# Patient Record
Sex: MALE | Race: OTHER | Employment: FULL TIME | URBAN - METROPOLITAN AREA
[De-identification: names, ages, dates, MRNs, and addresses within clinical notes are randomized per-mention and may not be internally consistent; named-entity substitution may affect disease eponyms.]

---

## 2023-04-03 PROBLEM — Z76.89 ENCOUNTER TO ESTABLISH CARE WITH NEW DOCTOR: Status: ACTIVE | Noted: 2023-04-03

## 2023-06-15 PROBLEM — R09.81 NASAL CONGESTION: Status: ACTIVE | Noted: 2020-05-06

## 2023-06-15 PROBLEM — J30.9 ALLERGIC RHINITIS: Status: ACTIVE | Noted: 2020-05-06

## 2023-06-15 PROBLEM — M41.84 DEXTROSCOLIOSIS OF THORACIC SPINE: Status: ACTIVE | Noted: 2023-06-15

## 2023-06-15 PROBLEM — M54.6 MIDLINE THORACIC BACK PAIN: Status: ACTIVE | Noted: 2023-06-15

## 2023-06-20 ENCOUNTER — OFFICE VISIT (OUTPATIENT)
Dept: ORTHOPEDIC SURGERY | Age: 28
End: 2023-06-20
Payer: COMMERCIAL

## 2023-06-20 VITALS — BODY MASS INDEX: 19.11 KG/M2 | HEIGHT: 69 IN | WEIGHT: 129 LBS

## 2023-06-20 DIAGNOSIS — M41.124 ADOLESCENT IDIOPATHIC SCOLIOSIS OF THORACIC REGION: ICD-10-CM

## 2023-06-20 DIAGNOSIS — M54.50 LOW BACK PAIN, UNSPECIFIED BACK PAIN LATERALITY, UNSPECIFIED CHRONICITY, UNSPECIFIED WHETHER SCIATICA PRESENT: Primary | ICD-10-CM

## 2023-06-20 DIAGNOSIS — M40.204 KYPHOSIS OF THORACIC REGION, UNSPECIFIED KYPHOSIS TYPE: ICD-10-CM

## 2023-06-20 PROCEDURE — 99204 OFFICE O/P NEW MOD 45 MIN: CPT | Performed by: NURSE PRACTITIONER

## 2023-06-20 RX ORDER — METHYLPREDNISOLONE 4 MG/1
TABLET ORAL
Qty: 1 KIT | Refills: 0 | Status: SHIPPED | OUTPATIENT
Start: 2023-06-20

## 2023-06-20 RX ORDER — CYCLOBENZAPRINE HCL 5 MG
5 TABLET ORAL 3 TIMES DAILY PRN
Qty: 30 TABLET | Refills: 0 | Status: SHIPPED | OUTPATIENT
Start: 2023-06-20 | End: 2023-06-30

## 2023-06-20 NOTE — PROGRESS NOTES
Name: Ian Castillo  YOB: 1995  Gender: male  MRN: [id number]    CC: Thoracic back pain    HPI: This is a 29y.o. year old male who had acute on chronic complaints of thoracic back pain that is gotten worse through the years. Most recently the most acute episode has been over the past week. Pain occurs in the mid thoracic spine more towards the right scapula. He has known thoracic scoliosis. He has had no conservative treatment. He was referred to me by his primary care provider. He denies any lower extremity numbness or tingling. No tingling or numbness in the upper extremities. This patient  has not had lumbar surgery in the past.     Thus far, the patient has tried : Minimal conservative treatment  Current pain level: 5  Activities limited by pain: Sitting, bending, twisting       AMB PAIN ASSESSMENT 6/20/2023   Location of Pain Back   Location Modifiers Right   Frequency of Pain Constant   Limiting Behavior Yes   Result of Injury No   Work-Related Injury No   Are there other pain locations you wish to document? No            ROS/Meds/PSH/PMH/FH/SH: I personally reviewed the patient's collected intake data. Below are the pertinents:    No Known Allergies      Current Outpatient Medications:     methylPREDNISolone (MEDROL DOSEPACK) 4 MG tablet, Take by mouth as directed. Start in morning, Disp: 1 kit, Rfl: 0    cyclobenzaprine (FLEXERIL) 5 MG tablet, Take 1 tablet by mouth 3 times daily as needed for Muscle spasms, Disp: 30 tablet, Rfl: 0    diclofenac (VOLTAREN) 50 MG EC tablet, Take 1 tablet by mouth 2 times daily as needed for Pain, Disp: 60 tablet, Rfl: 3    No past surgical history on file. Patient Active Problem List   Diagnosis    Encounter to establish care with new doctor    Allergic rhinitis    Dextroscoliosis of thoracic spine    Midline thoracic back pain         Tobacco:  reports that he has never smoked.  He has never used smokeless tobacco.  Alcohol:   Social History

## 2024-08-13 ENCOUNTER — OFFICE VISIT (OUTPATIENT)
Dept: PRIMARY CARE CLINIC | Facility: CLINIC | Age: 29
End: 2024-08-13
Payer: COMMERCIAL

## 2024-08-13 VITALS
DIASTOLIC BLOOD PRESSURE: 64 MMHG | WEIGHT: 125 LBS | BODY MASS INDEX: 18.51 KG/M2 | TEMPERATURE: 98 F | HEIGHT: 69 IN | SYSTOLIC BLOOD PRESSURE: 114 MMHG | OXYGEN SATURATION: 99 % | HEART RATE: 69 BPM

## 2024-08-13 DIAGNOSIS — R45.86 MOOD SWINGS: ICD-10-CM

## 2024-08-13 DIAGNOSIS — Z76.89 ENCOUNTER TO ESTABLISH CARE WITH NEW DOCTOR: Primary | ICD-10-CM

## 2024-08-13 DIAGNOSIS — G47.00 INSOMNIA, UNSPECIFIED TYPE: ICD-10-CM

## 2024-08-13 PROCEDURE — 99214 OFFICE O/P EST MOD 30 MIN: CPT | Performed by: FAMILY MEDICINE

## 2024-08-13 RX ORDER — QUETIAPINE FUMARATE 25 MG/1
25 TABLET, FILM COATED ORAL NIGHTLY
Qty: 90 TABLET | Refills: 1 | Status: SHIPPED | OUTPATIENT
Start: 2024-08-13

## 2024-08-13 RX ORDER — FLUOXETINE 10 MG/1
10 CAPSULE ORAL DAILY
Qty: 90 CAPSULE | Refills: 1 | Status: SHIPPED | OUTPATIENT
Start: 2024-08-13

## 2024-08-13 SDOH — ECONOMIC STABILITY: INCOME INSECURITY: HOW HARD IS IT FOR YOU TO PAY FOR THE VERY BASICS LIKE FOOD, HOUSING, MEDICAL CARE, AND HEATING?: NOT HARD AT ALL

## 2024-08-13 SDOH — ECONOMIC STABILITY: FOOD INSECURITY: WITHIN THE PAST 12 MONTHS, YOU WORRIED THAT YOUR FOOD WOULD RUN OUT BEFORE YOU GOT MONEY TO BUY MORE.: NEVER TRUE

## 2024-08-13 SDOH — ECONOMIC STABILITY: FOOD INSECURITY: WITHIN THE PAST 12 MONTHS, THE FOOD YOU BOUGHT JUST DIDN'T LAST AND YOU DIDN'T HAVE MONEY TO GET MORE.: NEVER TRUE

## 2024-08-13 ASSESSMENT — PATIENT HEALTH QUESTIONNAIRE - PHQ9
9. THOUGHTS THAT YOU WOULD BE BETTER OFF DEAD, OR OF HURTING YOURSELF: NOT AT ALL
6. FEELING BAD ABOUT YOURSELF - OR THAT YOU ARE A FAILURE OR HAVE LET YOURSELF OR YOUR FAMILY DOWN: NOT AT ALL
SUM OF ALL RESPONSES TO PHQ QUESTIONS 1-9: 12
SUM OF ALL RESPONSES TO PHQ QUESTIONS 1-9: 12
7. TROUBLE CONCENTRATING ON THINGS, SUCH AS READING THE NEWSPAPER OR WATCHING TELEVISION: NOT AT ALL
5. POOR APPETITE OR OVEREATING: NOT AT ALL
3. TROUBLE FALLING OR STAYING ASLEEP: NEARLY EVERY DAY
4. FEELING TIRED OR HAVING LITTLE ENERGY: NEARLY EVERY DAY
8. MOVING OR SPEAKING SO SLOWLY THAT OTHER PEOPLE COULD HAVE NOTICED. OR THE OPPOSITE, BEING SO FIGETY OR RESTLESS THAT YOU HAVE BEEN MOVING AROUND A LOT MORE THAN USUAL: NOT AT ALL
SUM OF ALL RESPONSES TO PHQ QUESTIONS 1-9: 12
1. LITTLE INTEREST OR PLEASURE IN DOING THINGS: NEARLY EVERY DAY
10. IF YOU CHECKED OFF ANY PROBLEMS, HOW DIFFICULT HAVE THESE PROBLEMS MADE IT FOR YOU TO DO YOUR WORK, TAKE CARE OF THINGS AT HOME, OR GET ALONG WITH OTHER PEOPLE: SOMEWHAT DIFFICULT
SUM OF ALL RESPONSES TO PHQ9 QUESTIONS 1 & 2: 6
2. FEELING DOWN, DEPRESSED OR HOPELESS: NEARLY EVERY DAY
SUM OF ALL RESPONSES TO PHQ QUESTIONS 1-9: 12

## 2024-08-13 ASSESSMENT — ENCOUNTER SYMPTOMS
VOMITING: 0
NAUSEA: 0
ABDOMINAL PAIN: 0
COUGH: 0
DIARRHEA: 0
SHORTNESS OF BREATH: 0

## 2024-08-13 NOTE — PATIENT INSTRUCTIONS
IT WAS GREAT TO SEE YOU TODAY!    WE WILL CHECK LABS WHEN YOU RETURN SO PLEASE COME FASTING (NOTHING TO EAT OR DRINK AFTER MIDNIGHT THE NIGHT BEFORE EXCEPT PLAIN WATER AND YOUR MEDICINE).    PLEASE TAKE ALL MEDICATION AS DISCUSSED.    ~TAKE THE FLUOXETINE WHEN YOU WAKE UP TO HELP WITH MOOD AND IRRITABILITY.    ~TAKE THE QUETIAPINE AT BEDTIME TO HELP WITH SLEEP.    CALL ONE OF THE THERAPISTS/COUNSELORS ON THE LIST TO SEE IF THEY CAN PROVIDE SOME HELP WITH YOUR MOOD SWINGS.    I WILL SEE YOU AGAIN IN 3 MONTHS BUT PLEASE CALL WITH CONCERNS [phone]

## 2024-08-13 NOTE — ASSESSMENT & PLAN NOTE
Chronic, patient works third shift and has issues with sleeping during the day.  Also with mood swings, family with history of depression, anxiety and bipolar disorder.  Will prescribe 25 mg of quetiapine at bedtime when patient feels more to help with sleep.  Will recheck in 3 months.

## 2024-08-13 NOTE — ASSESSMENT & PLAN NOTE
Chronic but worsening.  Patient feels like he is irritable and wife notes up-and-down mood swings with seemingly small triggers.  Plan to try low-dose fluoxetine 10 mg when he gets up to help with this.  Given list of therapists for patient to find a counselor.  Will recheck in 3 months.

## 2024-08-13 NOTE — PROGRESS NOTES
[facility] - [facility]  Jessica Meier Maribel, DO  [address], [place] [address] [phone]        ASSESSMENT AND PLAN    Problem List Items Addressed This Visit          Other    Encounter to establish care with new doctor - Primary    Mood swings      Chronic but worsening.  Patient feels like he is irritable and wife notes up-and-down mood swings with seemingly small triggers.  Plan to try low-dose fluoxetine 10 mg when he gets up to help with this.  Given list of therapists for patient to find a counselor.  Will recheck in 3 months.         Insomnia      Chronic, patient works third shift and has issues with sleeping during the day.  Also with mood swings, family with history of depression, anxiety and bipolar disorder.  Will prescribe 25 mg of quetiapine at bedtime when patient feels more to help with sleep.  Will recheck in 3 months.             The diagnoses and plan were discussed with the patient, who verbalizes understanding and agrees with plan.  All questions answered.    Chief Complaint    Chief Complaint   Patient presents with    Insomnia    Mood Swings    Fatigue       HISTORY OF PRESENT ILLNESS    29 y.o. male presents today to establish care with a new primary care provider.  Started having issues with depression and anger about five years ago.  Has never taken medicine for this before.  Has also had issues with sleep, as well.  States that he tries to get some sleep, usually only sleeps for an hour or two at a time.  Wife notes that he snores loudly.  Step-dad has sleep apnea.  Notes a lot of fatigue.  States that he has had mood swings, notes that the smallest thing will set him off.  States that his mother and father have depression and anxiety.  Aunt has bipolar disorder.  Sister also has depression and anxiety.      PAST MEDICAL HISTORY    Past Medical History:   Diagnosis Date    Scoliosis        PAST SURGICAL HISTORY    No past surgical history on

## 2024-12-23 ENCOUNTER — LAB (OUTPATIENT)
Dept: OBGYN CLINIC | Age: 29
End: 2024-12-23

## 2024-12-23 DIAGNOSIS — Z13.71 SCREENING FOR GENETIC DISEASE CARRIER STATUS: Primary | ICD-10-CM

## 2024-12-27 LAB — HGB FRACT BLD ELPH-IMP: NORMAL

## 2025-04-21 ENCOUNTER — OFFICE VISIT (OUTPATIENT)
Dept: PRIMARY CARE CLINIC | Facility: CLINIC | Age: 30
End: 2025-04-21
Payer: COMMERCIAL

## 2025-04-21 VITALS
HEIGHT: 69 IN | OXYGEN SATURATION: 98 % | BODY MASS INDEX: 17.92 KG/M2 | HEART RATE: 114 BPM | WEIGHT: 121 LBS | DIASTOLIC BLOOD PRESSURE: 62 MMHG | SYSTOLIC BLOOD PRESSURE: 112 MMHG | TEMPERATURE: 98.4 F

## 2025-04-21 DIAGNOSIS — F51.01 PRIMARY INSOMNIA: ICD-10-CM

## 2025-04-21 DIAGNOSIS — R00.0 TACHYCARDIA: ICD-10-CM

## 2025-04-21 DIAGNOSIS — F33.1 MODERATE EPISODE OF RECURRENT MAJOR DEPRESSIVE DISORDER (HCC): Primary | ICD-10-CM

## 2025-04-21 PROCEDURE — 99214 OFFICE O/P EST MOD 30 MIN: CPT | Performed by: FAMILY MEDICINE

## 2025-04-21 RX ORDER — QUETIAPINE FUMARATE 25 MG/1
25 TABLET, FILM COATED ORAL NIGHTLY
Qty: 90 TABLET | Refills: 1 | Status: SHIPPED | OUTPATIENT
Start: 2025-04-21

## 2025-04-21 RX ORDER — SERTRALINE HYDROCHLORIDE 25 MG/1
25 TABLET, FILM COATED ORAL DAILY
Qty: 90 TABLET | Refills: 1 | Status: SHIPPED | OUTPATIENT
Start: 2025-04-21

## 2025-04-21 SDOH — ECONOMIC STABILITY: FOOD INSECURITY: WITHIN THE PAST 12 MONTHS, YOU WORRIED THAT YOUR FOOD WOULD RUN OUT BEFORE YOU GOT MONEY TO BUY MORE.: NEVER TRUE

## 2025-04-21 SDOH — ECONOMIC STABILITY: FOOD INSECURITY: WITHIN THE PAST 12 MONTHS, THE FOOD YOU BOUGHT JUST DIDN'T LAST AND YOU DIDN'T HAVE MONEY TO GET MORE.: NEVER TRUE

## 2025-04-21 ASSESSMENT — ENCOUNTER SYMPTOMS
SHORTNESS OF BREATH: 0
ABDOMINAL PAIN: 0
NAUSEA: 0
COUGH: 0
DIARRHEA: 0
VOMITING: 0

## 2025-04-21 ASSESSMENT — PATIENT HEALTH QUESTIONNAIRE - PHQ9
4. FEELING TIRED OR HAVING LITTLE ENERGY: NEARLY EVERY DAY
SUM OF ALL RESPONSES TO PHQ QUESTIONS 1-9: 18
7. TROUBLE CONCENTRATING ON THINGS, SUCH AS READING THE NEWSPAPER OR WATCHING TELEVISION: NOT AT ALL
SUM OF ALL RESPONSES TO PHQ QUESTIONS 1-9: 18
3. TROUBLE FALLING OR STAYING ASLEEP: NEARLY EVERY DAY
6. FEELING BAD ABOUT YOURSELF - OR THAT YOU ARE A FAILURE OR HAVE LET YOURSELF OR YOUR FAMILY DOWN: NEARLY EVERY DAY
5. POOR APPETITE OR OVEREATING: NEARLY EVERY DAY
2. FEELING DOWN, DEPRESSED OR HOPELESS: NEARLY EVERY DAY
SUM OF ALL RESPONSES TO PHQ QUESTIONS 1-9: 18
9. THOUGHTS THAT YOU WOULD BE BETTER OFF DEAD, OR OF HURTING YOURSELF: NOT AT ALL
8. MOVING OR SPEAKING SO SLOWLY THAT OTHER PEOPLE COULD HAVE NOTICED. OR THE OPPOSITE, BEING SO FIGETY OR RESTLESS THAT YOU HAVE BEEN MOVING AROUND A LOT MORE THAN USUAL: NOT AT ALL
1. LITTLE INTEREST OR PLEASURE IN DOING THINGS: NEARLY EVERY DAY
10. IF YOU CHECKED OFF ANY PROBLEMS, HOW DIFFICULT HAVE THESE PROBLEMS MADE IT FOR YOU TO DO YOUR WORK, TAKE CARE OF THINGS AT HOME, OR GET ALONG WITH OTHER PEOPLE: SOMEWHAT DIFFICULT
SUM OF ALL RESPONSES TO PHQ QUESTIONS 1-9: 18

## 2025-04-21 NOTE — PROGRESS NOTES
[facility]  Jessicadivya Meier Maribel, DO  [address], [address]  [place] [id number] [phone]         ASSESSMENT AND PLAN    Problem List Items Addressed This Visit          Circulatory    Tachycardia    New finding on exam, patient states that he just drank Dr. Pepper.  Denies symptoms.  Will monitor on recheck.            Other    Insomnia    Chronic, did well with Quetiapine as needed.  Refill sent.         Relevant Orders    [facility] (Counseling)    Moderate episode of recurrent major depressive disorder (HCC) - Primary    Chronic, unmanaged.  Patient with persistent mood swings and snapping at his wife.  Did not think Fluoxetine helped with his communication.  Will try on Sertraline at bedtime.  Recheck in 3 months.  Will also refer to counseling.         Relevant Medications    sertraline (ZOLOFT) 25 MG tablet    QUEtiapine (SEROQUEL) 25 MG tablet    Other Relevant Orders    [facility] (Counseling)        The diagnoses and plan were discussed with the patient, who verbalizes understanding and agrees with plan.  All questions answered.    Chief Complaint    Chief Complaint   Patient presents with    Depression    Follow-up    Discuss Medications     Increase Prozac     Insomnia         HISTORY OF PRESENT ILLNESS    29 y.o. male presents today for follow up.  Last seen eight months ago to establish care, started on low-dose Fluoxetine to help with mood swings and given a list of counselors.  Was having issues with insomnia as well, so started on Quetiapine to help with sleep.  States that the Fluoxetine did not help much, states that it did not help with communication with his wife.  Notes that he was able to get Quetiapine with did help with sleep, asks for a refill.  Does not have to take it every day.  Notes that his wife is scheduled for a  in 3 days and they will be welcoming twins.

## 2025-04-21 NOTE — PATIENT INSTRUCTIONS
IT WAS GREAT TO SEE YOU TODAY!    I WILL CONTACT YOU WITH THE RESULTS OF YOUR LABS.    PLEASE TAKE ALL MEDICATION AS DISCUSSED.    I WILL SEE YOU AGAIN IN 3 WEEKS/MONTHS BUT PLEASE CALL WITH CONCERNS [phone]

## 2025-04-21 NOTE — ASSESSMENT & PLAN NOTE
Chronic, unmanaged.  Patient with persistent mood swings and snapping at his wife.  Did not think Fluoxetine helped with his communication.  Will try on Sertraline at bedtime.  Recheck in 3 months.  Will also refer to counseling.

## 2025-04-21 NOTE — ASSESSMENT & PLAN NOTE
New finding on exam, patient states that he just drank Dr. Pepper.  Denies symptoms.  Will monitor on recheck.